# Patient Record
Sex: FEMALE | Race: AMERICAN INDIAN OR ALASKA NATIVE
[De-identification: names, ages, dates, MRNs, and addresses within clinical notes are randomized per-mention and may not be internally consistent; named-entity substitution may affect disease eponyms.]

---

## 2018-01-04 ENCOUNTER — HOSPITAL ENCOUNTER (OUTPATIENT)
Dept: HOSPITAL 89 - MAMO | Age: 38
End: 2018-01-04
Attending: NURSE PRACTITIONER
Payer: COMMERCIAL

## 2018-01-04 DIAGNOSIS — Z12.31: Primary | ICD-10-CM

## 2018-01-04 PROCEDURE — 77063 BREAST TOMOSYNTHESIS BI: CPT

## 2018-01-04 PROCEDURE — 77067 SCR MAMMO BI INCL CAD: CPT

## 2018-01-05 NOTE — RADIOLOGY IMAGING REPORT
FACILITY: [facility] 

 

PATIENT NAME: YASMEEN SUERO

: [id number]

MR: [id number]

V: [id number]

EXAM DATE: [id number]

ORDERING PHYSICIAN: MIRANDA SCHERER

TECHNOLOGIST: Pamela Vee

 

PROCEDURE:BILATERAL DIGITAL SCREENING MAMMOGRAM WITH CAD ASSISTED 

INTERPRETATION AND 3D BREAST TOMOSYNTHESIS. 

 

COMPARISON:None.  

 

INDICATIONS:SCREENING

 

FINDINGS:  

Moderately dense fibroglandular tissue is seen throughout the breasts.  

There is no demonstration of malignant appearing mass, malignant 

appearing calcification or secondary sign of malignancy in either 

breast.  

 

DIAGNOSTIC CATEGORY 2--BENIGN FINDING.  

 

RECOMMENDATIONS:

ROUTINE MAMMOGRAM AND CLINICAL EVALUATION.   

 

IMPRESSION: 

BI-RADS 2:  No significant abnormality seen.

 

Images were reviewed with R2CAD and 3D breast tomosynthesis.

 

 

Dictated by:  Augustina Haque M.D. on 2018 at 16:46   

Transcribed by: FAWN on 2018 at 18:51    

Approved by:  Augustina Haque M.D. on 2018 at 8:25   

[facility]